# Patient Record
Sex: MALE | Race: OTHER | HISPANIC OR LATINO | URBAN - METROPOLITAN AREA
[De-identification: names, ages, dates, MRNs, and addresses within clinical notes are randomized per-mention and may not be internally consistent; named-entity substitution may affect disease eponyms.]

---

## 2023-06-14 NOTE — ASU PATIENT PROFILE, ADULT - VISION (WITH CORRECTIVE LENSES IF THE PATIENT USUALLY WEARS THEM):
glasses PRN/Partially impaired: cannot see medication labels or newsprint, but can see obstacles in path, and the surrounding layout; can count fingers at arm's length

## 2023-06-14 NOTE — ASU PATIENT PROFILE, ADULT - FALL HARM RISK - UNIVERSAL INTERVENTIONS
Bed in lowest position, wheels locked, appropriate side rails in place/Call bell, personal items and telephone in reach/Instruct patient to call for assistance before getting out of bed or chair/Non-slip footwear when patient is out of bed/[place] to call system/Physically safe environment - no spills, clutter or unnecessary equipment/Purposeful Proactive Rounding/Room/bathroom lighting operational, light cord in reach

## 2023-06-14 NOTE — ASU PATIENT PROFILE, ADULT - NS TRANSFER PATIENT BELONGINGS
----- Message from JOSE Alonso sent at 9/20/2020  3:52 PM CDT -----  Please update patient on stable lab results with the exception to elevated hgb a1c and along with cholesterol and ldl. I would recommend some dietary modifications and if patient is interested I would like him to take red yeast rice with the fish oil. Will recheck in one year.   Wrist Watch/Clothing

## 2023-06-15 ENCOUNTER — OUTPATIENT (OUTPATIENT)
Dept: OUTPATIENT SERVICES | Facility: HOSPITAL | Age: 63
LOS: 1 days | Discharge: HOME | End: 2023-06-15

## 2023-06-15 VITALS
RESPIRATION RATE: 16 BRPM | HEART RATE: 54 BPM | DIASTOLIC BLOOD PRESSURE: 77 MMHG | TEMPERATURE: 98 F | SYSTOLIC BLOOD PRESSURE: 120 MMHG | OXYGEN SATURATION: 99 % | WEIGHT: 165.35 LBS | HEIGHT: 68 IN

## 2023-06-15 VITALS
TEMPERATURE: 98 F | HEART RATE: 57 BPM | DIASTOLIC BLOOD PRESSURE: 75 MMHG | OXYGEN SATURATION: 98 % | RESPIRATION RATE: 16 BRPM | SYSTOLIC BLOOD PRESSURE: 120 MMHG

## 2023-06-15 RX ORDER — ACETAMINOPHEN 500 MG
2 TABLET ORAL
Qty: 0 | Refills: 0 | DISCHARGE
Start: 2023-06-15

## 2023-06-15 RX ORDER — LISINOPRIL/HYDROCHLOROTHIAZIDE 10-12.5 MG
1 TABLET ORAL
Refills: 0 | DISCHARGE

## 2023-06-15 RX ORDER — ACETAMINOPHEN 500 MG
650 TABLET ORAL ONCE
Refills: 0 | Status: DISCONTINUED | OUTPATIENT
Start: 2023-06-15 | End: 2023-06-15

## 2023-06-15 RX ORDER — SODIUM CHLORIDE 9 MG/ML
500 INJECTION, SOLUTION INTRAVENOUS
Refills: 0 | Status: DISCONTINUED | OUTPATIENT
Start: 2023-06-15 | End: 2023-06-15

## 2023-06-15 NOTE — ASU DISCHARGE PLAN (ADULT/PEDIATRIC) - NS MD DC FALL RISK RISK
For information on Fall & Injury Prevention, visit: [email or web address] OR  [email or web address] OR  https://www.cdc.gov/steadi/patient.html

## 2023-06-15 NOTE — OPERATIVE REPORT - OPERATIVE RPOSRT DETAILS
DATE OF PROCEDURE: 6/15/2023    SURGEON: BEAU CALHOUN MD     ANESTHESIA:  MAC and peribulbar injection.    PREOPERATIVE DIAGNOSIS(ES):  PRIMARY OPEN ANGLE GLAUCOMA. RIGHT EYE    POSTOPERATIVE DIAGNOSIS(ES):  THE SAME.    OPERATION:  Trabeculectomy with mitomycin C. Right eye    SPECIMENS:  None.    COMPLICATIONS:  None.    ESTIMATED BLOOD LOSS: <1 cc      PROCEDURE:  Prior to the procedure all risks, benefits and alternatives were discussed with the patient, including but not limited to infection, bleeding, retinal detachment, increase or decrease in intraocular pressure, corneal edema, ptosis, diplopia, loss of vision, no improvement of vision, need for second surgery, etc. All questions were answered and the patient wished to proceed with the surgery.  The patient was wheeled to the operating room and placed on the operating room table in a supine position.  The right eye was prepped and draped in the usual sterile fashion for intraocular surgery. A 40/60 mixture of Lidocaine 4% and Bupivacaine 0.75% was used as peribulbar anesthesia. An eyelid speculum was placed into the right eye.  Topical 4% preservative free lidocaine was placed for topical anesthesia.  An 8-0 vicryl traction suture was placed superiorly. 0.1 ml of a solution containing 0.4mg of mitomycin C was injected under the superior conjunctiva and spread with a Weck cell sponge. Using conjunctival forceps and Vannas scissors, a superior limbal peritomy was performed and extended for 3 clock hours. Mery scissors were then used to perform posterior, nasal, and temporal Tenon's dissection.  A 0.12 forceps was used to grasp the sclera and the scleral bed was cleaned with a #69 [place] blade. A trapezoidal scleral flap was then outlined using the #69 blade. Hemostasis was obtained using a wet-field cautery. The area was irrigated with balanced salt solution. A crescent knife was then used to dissect the scleral flap to the limbus.  Next, a paracentesis was created temporally with a 30-gauge MVR blade, and a small amount of Healon was injected in the anterior chamber.    The scleral flap was then grasped with a 0.12 forceps and the anterior chamber was entered with a 2.2 keratome. A Celena punch was used to remove a portion of the trabecular meshwork, and then forceps and Vannas scissors were used to create a surgical iridectomy. Three 10-0 nylon sutures were used to secure the flap edges, 2 at either corner and 1 posteriorly. The sutures were tightened to adjust for proper flow, and then buried.    The conjunctiva and Tenon's fascia were then re-approximated, and the limbal incision was closed using 9-0 nylon sutures at either wing, a 10-0 nylon suture nasally, and 10-0 nylon mattress suture at the limbus. Residual viscoelastic Miochol was injected into the anterior chamber.  The paracentesis was hydrated and tested for leakage. The anterior chamber was noted to remain deep with no leakage coming from the wounds. There was a diffuse bleb superiorly.  Antibiotics were used topically, and steroids were injected in the subconjunctival space. The eyelid speculum was removed and the eye was patched and shielded. The patient was wheeled to the recovery room in stable and excellent condition.
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
## 2024-07-12 PROBLEM — Z87.898 PERSONAL HISTORY OF OTHER SPECIFIED CONDITIONS: Chronic | Status: ACTIVE | Noted: 2023-06-15

## 2024-07-12 PROBLEM — I10 ESSENTIAL (PRIMARY) HYPERTENSION: Chronic | Status: ACTIVE | Noted: 2023-06-15

## 2024-09-17 RX ORDER — CYCLOPENTOLATE HCL 1 %
1 DROPS OPHTHALMIC (EYE)
Refills: 0 | Status: DISCONTINUED | OUTPATIENT
Start: 2024-09-19 | End: 2024-09-19

## 2024-09-18 NOTE — ASU PATIENT PROFILE, ADULT - REASON FOR ADMISSION, PROFILE
CATARACT EXTRACTION WITH INTRAOCULAR LENS IMPLANT, GONIOTOM , REVISION OF BLEB, MITOMYCIN INJECTION CATARACT EXTRACTION with IOL right eye , GONIOTOM , REVISION OF BLEB, MITOMYCIN INJECTION

## 2024-09-18 NOTE — ASU PATIENT PROFILE, ADULT - NS PREOP UNDERSTANDS INFO
No solid food/dairy/candy/gum after 06:30am tomorrow; water/clear apple juice/black coffee/tea/Gatorade allowed before 12:30pm tomorrow; patient reminded to come with photo ID/insurance/credit card; dress in comfortable clothes; no jewelries/contact lens/valuable; no smoking/alcohol drinking/recreational drug use today; escort to have photo ID; address and callback number was given/yes

## 2024-09-18 NOTE — ASU PATIENT PROFILE, ADULT - NSICDXPASTSURGICALHX_GEN_ALL_CORE_FT
PAST SURGICAL HISTORY:  Status post glaucoma surgery right     PAST SURGICAL HISTORY:  H/O colonoscopy     Status post glaucoma surgery right

## 2024-09-19 ENCOUNTER — OUTPATIENT (OUTPATIENT)
Dept: OUTPATIENT SERVICES | Facility: HOSPITAL | Age: 64
LOS: 1 days | Discharge: HOME | End: 2024-09-19

## 2024-09-19 VITALS
DIASTOLIC BLOOD PRESSURE: 80 MMHG | HEART RATE: 52 BPM | WEIGHT: 157.63 LBS | SYSTOLIC BLOOD PRESSURE: 139 MMHG | TEMPERATURE: 98 F | HEIGHT: 68 IN | RESPIRATION RATE: 16 BRPM | OXYGEN SATURATION: 99 %

## 2024-09-19 VITALS
RESPIRATION RATE: 14 BRPM | DIASTOLIC BLOOD PRESSURE: 77 MMHG | SYSTOLIC BLOOD PRESSURE: 125 MMHG | HEART RATE: 62 BPM | OXYGEN SATURATION: 98 %

## 2024-09-19 DIAGNOSIS — Z98.890 OTHER SPECIFIED POSTPROCEDURAL STATES: Chronic | ICD-10-CM

## 2024-09-19 DIAGNOSIS — Z98.83 FILTERING (VITREOUS) BLEB AFTER GLAUCOMA SURGERY STATUS: Chronic | ICD-10-CM

## 2024-09-19 LAB — GLUCOSE BLDC GLUCOMTR-MCNC: 98 MG/DL — SIGNIFICANT CHANGE UP (ref 70–99)

## 2024-09-19 DEVICE — CATH MICRO OPHTH DILATION
Type: IMPLANTABLE DEVICE | Site: RIGHT | Status: NON-FUNCTIONAL
Removed: 2024-09-19

## 2024-09-19 DEVICE — IMPLANTABLE DEVICE
Type: IMPLANTABLE DEVICE | Site: RIGHT | Status: NON-FUNCTIONAL
Removed: 2024-09-19

## 2024-09-19 RX ORDER — CYCLOPENTOLATE HCL 1 %
1 DROPS OPHTHALMIC (EYE)
Qty: 0 | Refills: 0 | DISCHARGE
Start: 2024-09-19

## 2024-09-19 RX ORDER — TETRACAINE HCL 0.5 %
1 DROPS OPHTHALMIC (EYE) ONCE
Refills: 0 | Status: COMPLETED | OUTPATIENT
Start: 2024-09-19 | End: 2024-09-19

## 2024-09-19 RX ORDER — PHENYLEPHRINE HYDROCHLORIDE 25 MG/ML
1 SOLUTION/ DROPS OPHTHALMIC
Refills: 0 | Status: COMPLETED | OUTPATIENT
Start: 2024-09-19 | End: 2024-09-19

## 2024-09-19 RX ORDER — TROPICAMIDE 1 %
1 DROPS OPHTHALMIC (EYE)
Refills: 0 | Status: COMPLETED | OUTPATIENT
Start: 2024-09-19 | End: 2024-09-19

## 2024-09-19 RX ORDER — OFLOXACIN 3 MG/ML
1 SOLUTION/ DROPS OPHTHALMIC
Refills: 0 | Status: COMPLETED | OUTPATIENT
Start: 2024-09-19 | End: 2024-09-19

## 2024-09-19 RX ADMIN — Medication 1 DROP(S): at 14:30

## 2024-09-19 RX ADMIN — OFLOXACIN 1 DROP(S): 3 SOLUTION/ DROPS OPHTHALMIC at 14:30

## 2024-09-19 RX ADMIN — Medication 1 DROP(S): at 14:35

## 2024-09-19 RX ADMIN — OFLOXACIN 1 DROP(S): 3 SOLUTION/ DROPS OPHTHALMIC at 14:35

## 2024-09-19 RX ADMIN — PHENYLEPHRINE HYDROCHLORIDE 1 DROP(S): 25 SOLUTION/ DROPS OPHTHALMIC at 14:35

## 2024-09-19 RX ADMIN — PHENYLEPHRINE HYDROCHLORIDE 1 DROP(S): 25 SOLUTION/ DROPS OPHTHALMIC at 14:30

## 2024-09-19 RX ADMIN — PHENYLEPHRINE HYDROCHLORIDE 1 DROP(S): 25 SOLUTION/ DROPS OPHTHALMIC at 14:40

## 2024-09-19 RX ADMIN — OFLOXACIN 1 DROP(S): 3 SOLUTION/ DROPS OPHTHALMIC at 14:40

## 2024-09-19 RX ADMIN — Medication 1 DROP(S): at 14:40

## 2024-09-19 NOTE — PRE-ANESTHESIA EVALUATION ADULT - NSANTHOSAYNRD_GEN_A_CORE
No. STAR screening performed.  STOP BANG Legend: 0-2 = LOW Risk; 3-4 = INTERMEDIATE Risk; 5-8 = HIGH Risk
No. STAR screening performed.  STOP BANG Legend: 0-2 = LOW Risk; 3-4 = INTERMEDIATE Risk; 5-8 = HIGH Risk

## 2024-09-19 NOTE — OPERATIVE REPORT - OPERATIVE RPOSRT DETAILS
DATE: 9/19/2024    SUREGEON : BEAU CALHOUN MD      ANESTHESIA:  MAC + Peribulbar injection.    PREOPERATIVE DIAGNOSIS(ES):  Glaucoma and cataract, Right eye.    POSTOPERATIVE DIAGNOSIS(ES):  Glaucoma and pseudophakia, Right eye.    OPERATION:  Cataract extraction with intraocular lens insertion, goniotomy and bleb needle revision with mitomycin C injection, right eye.    IMPLANTS:  B&L EE, +17.50 D.    COMPLICATIONS:  None.    SPECIMENS:  None.    ESTIMATED BLOOD LOSS: <1 cc    DESCRIPTION OF PROCEDURE:  The patient was identified in the holding area.  The risks, benefits, and alternatives of surgery were discussed with the patient at length.  Informed consent was obtained.  The right eye was identified and marked.  The patient was brought to the operating room and placed in the supine position. A 40/60 mixture of lidocaine 4% and bupivacaine 0.75% was used as peribulbar anesthesia. The right eye was prepped and draped in the usual sterile fashion for intraocular surgery, and a lid speculum was then placed underneath the right eye. A 20-gauge MVR blade was used to create a paracentesis superotemporally and another one inferotemporally. Preservative-free 1% lidocaine was injected into the anterior chamber followed by Viscoat.    A 2.2 keratome was used to create a temporal clear corneal incision.  A cystotome was used to begin a continuous curvilinear capsulorrhexis which was finished with Utrata forceps.  Hydrodissection was done with BSS, and the lens was noted to rotate freely in the capsular bag.  The lens was divided in 4 quadrants with the prechopper of Baron, and was then phacoemulsified using the chop technique without complications (CDE: 0.18).  Residual cortical material was removed using single handpiece irrigation and aspiration. Healon was then injected into the capsular bag.    A B&L EE +17.50 D lens was implanted into the capsular bag and rotated into proper position using a Sinskey hook.  At this time, the patient's head and microscope were rotated to view the nasal angle.  An MVR blade was advanced through the main wound and used to create a full-thickness incision into the trabecular meshwork. Next, an iTrack catheter was inserted through the goniotomy with the aid of a 23-gauge micrograsper, and the goniotomy was extended superonasally. The nasal angle was treated for approximately 45 degrees.  Mild bleeding was noted.    The patient's head and microscope were then turned back to primary position.  Viscoelastic and heme were removed using irrigation and aspiration. All wounds were hydrated and found to be watertight.   Next, attention was brought to the failed trabeculectomy. An avascular, cystic bleb with a ring of steel was noticed.  A 30-gauge needle was used to perform transconjunctival bleb revision from a superotemporal approach. 1% preservative-free lidocaine was injected in the superior conjunctiva. The ring of steel was penetrated and adhesions were broken.  Then, a 30-gauge needle was advanced underneath the scleral flap into the ostia.  Increased flow was noted into the bleb.  BSS was injected into the anterior chamber to raise the intraocular pressure. The lens was centered, and the anterior chamber was deep.  Intraocular pressure was kept in the teens at the end of the case to prevent postoperative bleeding.  No complications were noted.   Finally, 40 mcg of mitomycin C was injected subconjunctivally in the superior quadrant. Topical antibiotics and subconjunctival steroids were applied to the operated eye.  The eyelid speculum was removed.  Maxitrol ointment was applied to the surface of the operated eye which was then patched and shielded.    The patient tolerated the procedure well and was brought to the postoperative care unit in stable condition.

## 2024-11-20 NOTE — ASU PATIENT PROFILE, ADULT - NS PREOP UNDERSTANDS INFO
Informed pt about surgery time 1400, last meal 0600 no dairy, and last drink 1000 of water or apple juice 3 hrs before surgery. Bring photo ID and isurance card to the first floor. Must have an escort that is 18+. Leave jewelry, piercings, and contacts at home./yes

## 2024-11-20 NOTE — ASU PATIENT PROFILE, ADULT - NSICDXPASTSURGICALHX_GEN_ALL_CORE_FT
PAST SURGICAL HISTORY:  H/O colonoscopy     Status post glaucoma surgery right     PAST SURGICAL HISTORY:  H/O colonoscopy     Right cataract     Status post glaucoma surgery right

## 2024-11-20 NOTE — ASU PATIENT PROFILE, ADULT - NSTOBACCO TYPE_GEN_A_CORE_RD
Patient given incentive spirometry for fluctuations in O2 stat. IS level is 2500. Patient reports he feels more awake, alert and is able to go home. MDA made aware and is okay with patient's current oxygen level of 91-97% on room air. Patient educated on the use of IS and advised to come to the nearest ED if he has any issues at home.   
Cigarettes

## 2024-11-21 ENCOUNTER — OUTPATIENT (OUTPATIENT)
Dept: OUTPATIENT SERVICES | Facility: HOSPITAL | Age: 64
LOS: 1 days | Discharge: HOME | End: 2024-11-21

## 2024-11-21 VITALS
OXYGEN SATURATION: 98 % | WEIGHT: 156.09 LBS | HEART RATE: 54 BPM | RESPIRATION RATE: 16 BRPM | DIASTOLIC BLOOD PRESSURE: 85 MMHG | HEIGHT: 68 IN | SYSTOLIC BLOOD PRESSURE: 144 MMHG | TEMPERATURE: 98 F

## 2024-11-21 VITALS
OXYGEN SATURATION: 97 % | DIASTOLIC BLOOD PRESSURE: 78 MMHG | RESPIRATION RATE: 16 BRPM | TEMPERATURE: 97 F | SYSTOLIC BLOOD PRESSURE: 120 MMHG | HEART RATE: 55 BPM

## 2024-11-21 DIAGNOSIS — H26.9 UNSPECIFIED CATARACT: Chronic | ICD-10-CM

## 2024-11-21 DIAGNOSIS — Z98.83 FILTERING (VITREOUS) BLEB AFTER GLAUCOMA SURGERY STATUS: Chronic | ICD-10-CM

## 2024-11-21 DIAGNOSIS — Z98.890 OTHER SPECIFIED POSTPROCEDURAL STATES: Chronic | ICD-10-CM

## 2024-11-21 DEVICE — CATH MICRO OPHTH DILATION
Type: IMPLANTABLE DEVICE | Status: NON-FUNCTIONAL
Removed: 2024-11-21

## 2024-11-21 RX ORDER — ACETAMINOPHEN 500 MG
2 TABLET ORAL
Qty: 0 | Refills: 0 | DISCHARGE
Start: 2024-11-21

## 2024-11-21 RX ORDER — TROPICAMIDE 1 %
1 DROPS OPHTHALMIC (EYE)
Refills: 0 | Status: COMPLETED | OUTPATIENT
Start: 2024-11-21 | End: 2024-11-21

## 2024-11-21 RX ORDER — ACETAMINOPHEN 500 MG
650 TABLET ORAL ONCE
Refills: 0 | Status: DISCONTINUED | OUTPATIENT
Start: 2024-11-21 | End: 2024-11-21

## 2024-11-21 RX ORDER — PHENYLEPHRINE HYDROCHLORIDE 25 MG/ML
1 SOLUTION/ DROPS OPHTHALMIC
Refills: 0 | Status: COMPLETED | OUTPATIENT
Start: 2024-11-21 | End: 2024-11-21

## 2024-11-21 RX ORDER — CYCLOPENTOLATE HCL 1 %
1 DROPS OPHTHALMIC (EYE)
Refills: 0 | Status: DISCONTINUED | OUTPATIENT
Start: 2024-11-21 | End: 2024-11-21

## 2024-11-21 RX ORDER — OFLOXACIN 3 MG/ML
1 SOLUTION OPHTHALMIC
Refills: 0 | Status: COMPLETED | OUTPATIENT
Start: 2024-11-21 | End: 2024-11-21

## 2024-11-21 RX ORDER — TETRACAINE HYDROCHLORIDE 5 MG/ML
1 SOLUTION OPHTHALMIC ONCE
Refills: 0 | Status: COMPLETED | OUTPATIENT
Start: 2024-11-21 | End: 2024-11-21

## 2024-11-21 RX ADMIN — PHENYLEPHRINE HYDROCHLORIDE 1 DROP(S): 25 SOLUTION/ DROPS OPHTHALMIC at 13:03

## 2024-11-21 RX ADMIN — PHENYLEPHRINE HYDROCHLORIDE 1 DROP(S): 25 SOLUTION/ DROPS OPHTHALMIC at 12:58

## 2024-11-21 RX ADMIN — OFLOXACIN 1 DROP(S): 3 SOLUTION OPHTHALMIC at 12:57

## 2024-11-21 RX ADMIN — OFLOXACIN 1 DROP(S): 3 SOLUTION OPHTHALMIC at 13:03

## 2024-11-21 RX ADMIN — PHENYLEPHRINE HYDROCHLORIDE 1 DROP(S): 25 SOLUTION/ DROPS OPHTHALMIC at 12:48

## 2024-11-21 RX ADMIN — Medication 1 DROP(S): at 12:58

## 2024-11-21 RX ADMIN — TETRACAINE HYDROCHLORIDE 1 DROP(S): 5 SOLUTION OPHTHALMIC at 12:48

## 2024-11-21 RX ADMIN — Medication 1 DROP(S): at 13:02

## 2024-11-21 RX ADMIN — Medication 1 DROP(S): at 12:48

## 2024-11-21 RX ADMIN — Medication 40 MILLILITER(S): at 19:04

## 2024-11-21 RX ADMIN — OFLOXACIN 1 DROP(S): 3 SOLUTION OPHTHALMIC at 12:48

## 2024-11-21 NOTE — ASU DISCHARGE PLAN (ADULT/PEDIATRIC) - PROCEDURE
Left Eye Cataract extraction with intraocular lens placement, AB intero Canaloplasty with goniotomy, Femto second laser and optiwave refractive analysis

## 2024-11-21 NOTE — ASU DISCHARGE PLAN (ADULT/PEDIATRIC) - FINANCIAL ASSISTANCE
[facility] provides services at a reduced cost to those who are determined to be eligible through [facility]’s financial assistance program. Information regarding [facility]’s financial assistance program can be found by going to [email or web address] or by calling [phone].

## 2024-11-21 NOTE — ASU DISCHARGE PLAN (ADULT/PEDIATRIC) - NS MD DC FALL RISK RISK
For information on Fall & Injury Prevention, visit: [email or web address] OR  [email or web address] OR  https://www.cdc.gov/steadi/patient.html

## 2024-11-21 NOTE — ASU DISCHARGE PLAN (ADULT/PEDIATRIC) - PATIENT EDUCATION MATERIALS PROVIED
Impression: Open angle with borderline findings, low risk, bilateral: H40.013. Plan: Discussed diagnosis in detail with patient. Discussed treatment options with patient. OCT ON ordered & reviewed with patient No change to current treatment. Will continue to observe condition and or symptoms. Provider pre-printed instructions given

## 2024-12-20 NOTE — PRE-ANESTHESIA EVALUATION ADULT - BP NONINVASIVE SYSTOLIC (MM HG)
89 FAMILY HISTORY:  Mother  Still living? Unknown  FH: heart disease, Age at diagnosis: Age Unknown

## (undated) DEVICE — DRAPE MAYO STAND 23"

## (undated) DEVICE — NUCLEUS HYDRODISSECTOR PEARCE ANGLED 25G X 22MM

## (undated) DEVICE — CANNULA IRR ANT CHAMBER 30G

## (undated) DEVICE — SUT ETHILON 10-0 8" TG160-4-3M

## (undated) DEVICE — PACK CENTURION 2.2MM

## (undated) DEVICE — PACK ANTERIOR SEGMENT

## (undated) DEVICE — GONIO LENS KATENA 1.2X RIGHT HAND

## (undated) DEVICE — VENODYNE/SCD SLEEVE CALF MEDIUM

## (undated) DEVICE — DRSG STERISTRIPS 0.5 X 4"

## (undated) DEVICE — Device

## (undated) DEVICE — ELCTR ERASER BI-P BVL 45DEG 18G

## (undated) DEVICE — FORCEP GRIESHABER MAXGRIP 23GA DISP

## (undated) DEVICE — MILOOP LENS MILOOP FRAGMENTATION DEVICE

## (undated) DEVICE — GLV 7.5 PROTEXIS (WHITE)

## (undated) DEVICE — KNIFE ALCON MVR V-LANCE 23G (BLACK)

## (undated) DEVICE — SOL IRR BAG BSS 500ML

## (undated) DEVICE — WARMING BLANKET LOWER ADULT

## (undated) DEVICE — DRAPE MICROSCOPE KNOB COVER SMALL (2 PCS)

## (undated) DEVICE — SUT ETHILON 10-0 12" CS160-6

## (undated) DEVICE — NDL HYPO NONSAFE 30G X 0.5" (BEIGE)

## (undated) DEVICE — APPLICATOR COTTON TIP 3" STERILE

## (undated) DEVICE — SYR LUER LOK 5CC

## (undated) DEVICE — NDL COUNTER DBL BLADEGUARD

## (undated) DEVICE — KNIFE ALCON MVR V-LANCE 20G (WHITE)

## (undated) DEVICE — KNIFE ALCON CRESCENT ANGLED BEVEL UP 2.3MM (PINK)

## (undated) DEVICE — DRSG TEGADERM 2.5X3"

## (undated) DEVICE — ELCTR BIPOLAR CORD 12FT

## (undated) DEVICE — SUT ETHILON 9-0 5" BV100-4

## (undated) DEVICE — ELCTR BIPOLAR CORD J&J 12FT DISP

## (undated) DEVICE — SPEAR SURG EYE WECK-CELL CELOS

## (undated) DEVICE — PETRI DISH MED 3.5"

## (undated) DEVICE — KNIFE ALCON SLIT INTREPID CLEAR-CUT SAFETY 2.4MM

## (undated) DEVICE — KNIFE ALCON I-KNIFE II STAB KNIFE STANDARD 3MM (GREEN)

## (undated) DEVICE — KNIFE ALCON CRESCENT STRAIGHT BEVEL UP 2.3MM (PINK)